# Patient Record
Sex: FEMALE | Race: OTHER | Employment: STUDENT | URBAN - METROPOLITAN AREA
[De-identification: names, ages, dates, MRNs, and addresses within clinical notes are randomized per-mention and may not be internally consistent; named-entity substitution may affect disease eponyms.]

---
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
## 2025-01-06 ENCOUNTER — TELEPHONE (OUTPATIENT)
Age: 20
End: 2025-01-06

## 2025-01-06 NOTE — TELEPHONE ENCOUNTER
Mahendra Lucas,     Here are some therapy and PHP/IOP program resources that may be a good fit. Please verify your insurance coverage with any providers that you may choose to call and schedule with directly. If there is anything else I can assist with, then please give me a call at [phone]. If you need more immediate assistance, or assistance outside of business hours, please contact the [facility] 24/7 helpline at [phone].     Therapy:    Yaneth Sears LCPC  [facility]  [address]  [address]  [place] [address]   Phone: [phone]    Dr. Jiaro Garzon, Psy.D  [facility]  [address]  [address]  [place] [address]   Phone: [phone]    Monie Reyes [facility]  [address]  [address]  [place] [address]   Phone: [phone]    DARREN Price  [address]  [address]  [place] [address]   Phone: [phone]    PHP/IOP Program Resources:    [facility]  [address], [place]   Phone: [phone]    [address]  Phone: [phone]    [facility]  [address], [place]   Phone: [phone]      Barbara Almeida (she/her/hers)  Patient Care Navigator Mental Health   [facility]/Mental Health Division  [phone] or 24/7 help line: [phone]  [email or web address]  Request an assessment or support »

## 2025-03-04 ENCOUNTER — OFFICE VISIT (OUTPATIENT)
Facility: LOCATION | Age: 20
End: 2025-03-04
Payer: COMMERCIAL

## 2025-03-04 ENCOUNTER — LAB ENCOUNTER (OUTPATIENT)
Dept: LAB | Age: 20
End: 2025-03-04
Attending: INTERNAL MEDICINE
Payer: COMMERCIAL

## 2025-03-04 VITALS
HEART RATE: 58 BPM | WEIGHT: 101 LBS | SYSTOLIC BLOOD PRESSURE: 94 MMHG | DIASTOLIC BLOOD PRESSURE: 62 MMHG | BODY MASS INDEX: 19.07 KG/M2 | HEIGHT: 61 IN

## 2025-03-04 DIAGNOSIS — F41.9 ANXIETY: ICD-10-CM

## 2025-03-04 DIAGNOSIS — R53.83 FATIGUE, UNSPECIFIED TYPE: ICD-10-CM

## 2025-03-04 DIAGNOSIS — E05.00 GRAVES DISEASE: ICD-10-CM

## 2025-03-04 DIAGNOSIS — E07.9 THYROID DISEASE: ICD-10-CM

## 2025-03-04 DIAGNOSIS — E05.90 HYPERTHYROIDISM: ICD-10-CM

## 2025-03-04 LAB — TSI SER-ACNC: 3.88 UIU/ML (ref 0.48–4.17)

## 2025-03-04 PROCEDURE — 99214 OFFICE O/P EST MOD 30 MIN: CPT | Performed by: INTERNAL MEDICINE

## 2025-03-04 PROCEDURE — 84443 ASSAY THYROID STIM HORMONE: CPT

## 2025-03-04 PROCEDURE — 36415 COLL VENOUS BLD VENIPUNCTURE: CPT

## 2025-03-04 RX ORDER — METHIMAZOLE 10 MG/1
10 TABLET ORAL 2 TIMES DAILY
Qty: 60 TABLET | Refills: 2 | Status: SHIPPED | OUTPATIENT
Start: 2025-03-04 | End: 2025-03-05

## 2025-03-04 RX ORDER — PROPRANOLOL HYDROCHLORIDE 60 MG/1
1 CAPSULE, EXTENDED RELEASE ORAL DAILY
Qty: 90 CAPSULE | Refills: 1 | Status: SHIPPED | OUTPATIENT
Start: 2025-03-04

## 2025-03-04 NOTE — PROGRESS NOTES
Reason for Visit:   Hyperthyroid  Requesting Physician:  AHMET MONCADA    CHIEF COMPLAINT:    Chief Complaint   Patient presents with    Hyperthyroidism     F/u      HISTORY OF PRESENT ILLNESS:   Shayy Martins is a 19 year old female who presents with  hyperthyroid due to graves   TSI high 6/2024   Was seen with mother   On MMI 10 mg every day but she skips x2 /week   Propranolol  60 mg ER   We referred to a therapist but she did not schedule it   Has a job now     She has anxiety, and too hyper sometimes.   She cannot concentrate and jittery   We discussed side effect from meds.   She reports taking sea vasquez which gave her palpitation - we discuss supplement and hyperthyroid from it     LMP 2/2025    Symptoms started  ~ 2 yrs ago. Passed out  at [employer] and went to the ER 3/2024   5/30/2024 went to the ER d/t abd pain was dx with UTI  CTA in the ER 5/30/2024    Not on birth control. Not sexually active.   Neck radiation: No   Biotin:  no  Turmeric: no       Wt Readings from Last 6 Encounters:   03/04/25 101 lb (45.8 kg) (5%, Z= -1.69)*   12/27/24 101 lb (45.8 kg) (5%, Z= -1.67)*   08/23/24 98 lb 4.8 oz (44.6 kg) (3%, Z= -1.89)*   08/05/24 96 lb (43.5 kg) (2%, Z= -2.11)*   07/09/24 97 lb 6.4 oz (44.2 kg) (2%, Z= -1.96)*   06/06/24 94 lb (42.6 kg) (1%, Z= -2.31)*     * Growth percentiles are based on CDC (Girls, 2-20 Years) data.         ASSESSMENT AND PLAN:  19 year old female who presents with  hyperthyroid due to Graves and anxiety   Clinically and biochemically, euthyroid   She is on BB and MMI  20 mg every day   On exam no GO. Has goiter. Not a smoker.   Not on birth control but pt and mother understand risk of complications from pregnancy in her case ( hyperthyroid, and side effect of Methimazole on pregnancy)   Discussed surgery vs RDZ vs meds again today.   She was skipping doses   Had hyperthyroid sx when took supplement most likely d/t iodine in them.     Plan      Propranolol ER 60 mg once a day    Methimazole 10 mg x2/day     D/w them today Dx graves, treatment options. Methimazole may cause significant bone marrow depression; the most severe manifestation is agranulocytosis. May also cause Hepatotoxicity (including acute liver failure) Symptoms suggestive of hepatic dysfunction (eg, anorexia, pruritus, right upper quadrant pain) should prompt evaluation. If you have nausea, vomiting, abdominal pain, jaundice- yellow skin or eye color-, dark urine, light stool color, fever, sore throat or infection, call us or the PCP.  Remission rate of ~ 40% with use of antithyroid drugs for 1-1.5 years, their side effects, monitoring, and follow-up issues, specifically agranulocytosis, liver toxicity, anaphylaxis, rash, lupus like syndrome, metallic taste in the mouth, and joint aches. We discussed that patient should discontinue methimazole at the earliest sign of a fever, sore throat, or other infection, should call our office and have WBC count with differential done. The drug should be held until the result is available.     If applicable, Hyperthyroid pregnancy counseling. General counseling on contraception (Z30.09).  We discussed in details the adverse effect of uncontrolled hyperthyroidism on pregnancy    Also discussed the risk of Methimazole on the fetus.   Please notify us if you are pregnant or you are planning to get pregnant.   Print out was given to the Pt     PAST MEDICAL HISTORY:   Past Medical History:    Hyperthyroidism     Hyperthyroid   asthma as a child  PAST SURGICAL HISTORY:   History reviewed. No pertinent surgical history.  no  CURRENT MEDICATIONS:     Propranolol HCl ER 60 MG Oral Capsule SR 24 Hr Take 1 capsule (60 mg total) by mouth daily. 90 capsule 1    methIMAzole 10 MG Oral Tab Take 1 tablet (10 mg total) by mouth in the morning and 1 tablet (10 mg total) before bedtime. 60 tablet 2   Inhaler     ALLERGIES:  No Known Allergies  no  SOCIAL HISTORY:    Social History     Socioeconomic  History    Marital status: Single   Tobacco Use    Smoking status: Never    Smokeless tobacco: Never   Substance and Sexual Activity    Alcohol use: Never    Drug use: Never   Finished home school    Smoking no  Marijuana no  Etoh no  Drugs no  Not on birth control , not planning pregnancy, never been pregnant before     FAMILY HISTORY:   History reviewed. No pertinent family history.   GF with DM   MGM breast ca , bipolar   MGF with cancer  Mother w/ Sjogren and migraine     PHYSICAL EXAM:   Height: 5' 1\" (154.9 cm) (03/04 1428)  Weight: 101 lb (45.8 kg) (03/04 1428)  BSA (Calculated - sq m): 1.41 sq meters (03/04 1428)  Pulse: 58 (03/04 1428)  BP: 94/62 (03/04 1428)  Temp: --  Do Not Use - Resp Rate: --  SpO2: --    Body mass index is 19.08 kg/m².   No tremors    No GO      DATA:     Pertinent data reviewed      Latest Reference Range & Units 08/05/24 19:35   D-Dimer <0.50 ug/mL FEU 1.32 (H)   T4,Free (Direct) 0.9 - 1.6 ng/dL 2.4 (H)   TSH 0.480 - 4.170 mIU/mL <0.008 (L)   (H): Data is abnormally high  (L): Data is abnormally low   08/05/24 22:06   MRI BRAIN WO ACUTE (3) SEQUENCE (CPT=70551) Rpt   Rpt: View report in Results Review for more information   08/05/24 23:47   CT CHEST PE AORTA (IV ONLY) (CPT=71260) Rpt   Rpt: View report in Results Review for more information   Latest Reference Range & Units 06/06/24 17:26   Thy Stim Immuno 0.00 - 0.55 IU/L 11.70 (H)        7/8/24  4:59 PM    Free T4  0.75 - 2.00 ng/dL 3.14 High      7/8/24  4:59 PM    Auto WBC  4.2 - 9.7 10*3 /uL 5.4        Latest Reference Range & Units 08/05/24 19:30   POCT urine pregnancy Negative  Negative            No results for input(s): \"TSH\", \"T4F\", \"T3F\", \"THYP\" in the last 72 hours.      No results found.        Orders Placed This Encounter   Procedures    TSH W Reflex To Free T4    TSH W Reflex To Free T4     Orders Placed This Encounter    TSH W Reflex To Free T4     Standing Status:   Future     Standing Expiration Date:   3/4/2026      Order Specific Question:   Release to patient     Answer:   Immediate    TSH W Reflex To Free T4     Standing Status:   Future     Standing Expiration Date:   3/4/2026     Order Specific Question:   Release to patient     Answer:   Immediate    Propranolol HCl ER 60 MG Oral Capsule SR 24 Hr     Sig: Take 1 capsule (60 mg total) by mouth daily.     Dispense:  90 capsule     Refill:  1     **Patient requests 90 days supply**    methIMAzole 10 MG Oral Tab     Sig: Take 1 tablet (10 mg total) by mouth in the morning and 1 tablet (10 mg total) before bedtime.     Dispense:  60 tablet     Refill:  2          This is a specialized patient consultation in endocrinology and required comprehensive review of prior records, as well as current evaluation, with time required for consideration of complex endocrine issues and consultation. For this visit, I personally interviewed the patient, and family member if accompanied, performed the pertinent parts of the history and physical examination. ROS included screening for appropriate endocrine conditions.   Today's diagnosis and plan were reviewed in detail with the patient who states understanding and agrees with plan. I discussed with the patient possible diagnosis, differential diagnosis, need for work up, treatment options, alternatives and side effects.     Please see note for details about time spent which includes:   · pre-visit preparation  · reviewing records  · face to face time with the patient   · timely documentation of the encounter  · ordering medications/tests  · communication with care team  · care coordination    I appreciate the opportunity to be part of your patient's medical care and will keep you, as the referring and primary physicians, informed about the care of your patient. Please feel free to contact me should you have any questions.    The 21st Century Cures Act makes medical notes like these available to patients in the interest of transparency. Please  be advised this is a medical document. Medical documents are intended to carry relevant information, facts as evident, and the clinical opinion of the practitioner. The medical note is intended as peer to peer communication and may appear blunt or direct. It is written in medical language and may contain abbreviations or verbiage that are unfamiliar.   Nicholas Cain MD

## 2025-03-04 NOTE — PATIENT INSTRUCTIONS
Labs today   Labs and follow up in 2 mo     Propranolol ER 60 mg once a day   Methimazole 10 mg x2/day     D/w them today Dx graves, treatment options. Methimazole may cause significant bone marrow depression; the most severe manifestation is agranulocytosis. May also cause Hepatotoxicity (including acute liver failure) Symptoms suggestive of hepatic dysfunction (eg, anorexia, pruritus, right upper quadrant pain) should prompt evaluation. If you have nausea, vomiting, abdominal pain, jaundice- yellow skin or eye color-, dark urine, light stool color, fever, sore throat or infection, call us or the PCP.  Remission rate of ~ 40% with use of antithyroid drugs for 1-1.5 years, their side effects, monitoring, and follow-up issues, specifically agranulocytosis, liver toxicity, anaphylaxis, rash, lupus like syndrome, metallic taste in the mouth, and joint aches. We discussed that patient should discontinue methimazole at the earliest sign of a fever, sore throat, or other infection, should call our office and have WBC count with differential done. The drug should be held until the result is available.     If applicable, Hyperthyroid pregnancy counseling. General counseling on contraception (Z30.09).  We discussed in details the adverse effect of uncontrolled hyperthyroidism on pregnancy    Also discussed the risk of Methimazole on the fetus.   Please notify us if you are pregnant or you are planning to get pregnant.   Print out was given to the Pt

## 2025-03-05 DIAGNOSIS — E07.9 THYROID DISEASE: ICD-10-CM

## 2025-03-05 DIAGNOSIS — F41.9 ANXIETY: ICD-10-CM

## 2025-03-05 DIAGNOSIS — R53.83 FATIGUE, UNSPECIFIED TYPE: ICD-10-CM

## 2025-03-05 DIAGNOSIS — E05.00 GRAVES DISEASE: ICD-10-CM

## 2025-03-05 DIAGNOSIS — E05.90 HYPERTHYROIDISM: ICD-10-CM

## 2025-03-05 RX ORDER — METHIMAZOLE 5 MG/1
15 TABLET ORAL DAILY
Qty: 270 TABLET | Refills: 0 | Status: SHIPPED | OUTPATIENT
Start: 2025-03-05

## 2025-05-16 DIAGNOSIS — R53.83 FATIGUE, UNSPECIFIED TYPE: ICD-10-CM

## 2025-05-16 DIAGNOSIS — E05.00 GRAVES DISEASE: ICD-10-CM

## 2025-05-16 DIAGNOSIS — E05.90 HYPERTHYROIDISM: ICD-10-CM

## 2025-05-16 DIAGNOSIS — F41.9 ANXIETY: ICD-10-CM

## 2025-05-16 DIAGNOSIS — E07.9 THYROID DISEASE: ICD-10-CM

## 2025-05-17 RX ORDER — PROPRANOLOL HYDROCHLORIDE 60 MG/1
1 CAPSULE, EXTENDED RELEASE ORAL DAILY
Qty: 30 CAPSULE | Refills: 0 | OUTPATIENT
Start: 2025-05-17

## 2025-05-17 NOTE — TELEPHONE ENCOUNTER
LOV:3/04/2025  RTC: 2M, no upcoming/MyChart sent  Last refill: 03/04/2025    Dr. Cain-please review and sign pended prescription if agreeable.

## 2025-05-18 RX ORDER — PROPRANOLOL HYDROCHLORIDE 60 MG/1
1 CAPSULE, EXTENDED RELEASE ORAL DAILY
Qty: 90 CAPSULE | Refills: 0 | Status: SHIPPED | OUTPATIENT
Start: 2025-05-18

## 2025-07-01 ENCOUNTER — TELEPHONE (OUTPATIENT)
Facility: LOCATION | Age: 20
End: 2025-07-01

## 2025-07-01 ENCOUNTER — TELEMEDICINE (OUTPATIENT)
Facility: LOCATION | Age: 20
End: 2025-07-01
Payer: COMMERCIAL

## 2025-07-01 DIAGNOSIS — E05.90 HYPERTHYROIDISM: Primary | ICD-10-CM

## 2025-07-01 DIAGNOSIS — R53.83 FATIGUE, UNSPECIFIED TYPE: ICD-10-CM

## 2025-07-01 DIAGNOSIS — E07.9 THYROID DISEASE: ICD-10-CM

## 2025-07-01 DIAGNOSIS — F41.9 ANXIETY: ICD-10-CM

## 2025-07-01 DIAGNOSIS — E05.00 GRAVES DISEASE: ICD-10-CM

## 2025-07-01 PROCEDURE — 99214 OFFICE O/P EST MOD 30 MIN: CPT | Performed by: INTERNAL MEDICINE

## 2025-07-01 RX ORDER — PROPRANOLOL HYDROCHLORIDE 60 MG/1
1 CAPSULE, EXTENDED RELEASE ORAL DAILY
Qty: 90 CAPSULE | Refills: 0 | Status: SHIPPED | OUTPATIENT
Start: 2025-07-01

## 2025-07-01 RX ORDER — METHIMAZOLE 5 MG/1
15 TABLET ORAL DAILY
Qty: 270 TABLET | Refills: 0 | Status: SHIPPED | OUTPATIENT
Start: 2025-07-01

## 2025-07-01 NOTE — PATIENT INSTRUCTIONS
Labs today and will reassess if can decrease MMI  doses   Labs and follow up in 2 months   Propranolol ER 60 mg once a day   Methimazole 15 mg /day     Will refer to psych     D/w them today Dx graves, treatment options. Methimazole may cause significant bone marrow depression; the most severe manifestation is agranulocytosis. May also cause Hepatotoxicity (including acute liver failure) Symptoms suggestive of hepatic dysfunction (eg, anorexia, pruritus, right upper quadrant pain) should prompt evaluation. If you have nausea, vomiting, abdominal pain, jaundice- yellow skin or eye color-, dark urine, light stool color, fever, sore throat or infection, call us or the PCP.  Remission rate of ~ 40% with use of antithyroid drugs for 1-1.5 years, their side effects, monitoring, and follow-up issues, specifically agranulocytosis, liver toxicity, anaphylaxis, rash, lupus like syndrome, metallic taste in the mouth, and joint aches. We discussed that patient should discontinue methimazole at the earliest sign of a fever, sore throat, or other infection, should call our office and have WBC count with differential done. The drug should be held until the result is available.   If applicable, Hyperthyroid pregnancy counseling. General counseling on contraception (Z30.09).  We discussed in details the adverse effect of uncontrolled hyperthyroidism on pregnancy    Also discussed the risk of Methimazole on the fetus.   Please notify us if you are pregnant or you are planning to get pregnant.

## 2025-07-01 NOTE — PROGRESS NOTES
Reason for Visit:   Hyperthyroid  Requesting Physician:  AHMET MONCADA    CHIEF COMPLAINT:    No chief complaint on file.     HISTORY OF PRESENT ILLNESS:   Shayy Martins is a 19 year old female who presents with  hyperthyroid due to graves   TSI high 6/2024   She has anxiety , SOB and fatigue   On MMI 15 mg every day  and Propranolol  60 mg ER.   She is not skipping doses   We referred to a therapist but she did not schedule it   Has a job now.  LMP 6/2025    Symptoms started  ~ 2 yrs ago. Passed out and went to the ER 3/2024   5/30/2024 went to the ER d/t abd pain was dx with UTI  CTA in the ER 5/30/2024  Not on birth control. Not sexually active.   Neck radiation: No   Biotin:  no  Turmeric: no     Wt Readings from Last 6 Encounters:   03/04/25 101 lb (45.8 kg) (5%, Z= -1.69)*   12/27/24 101 lb (45.8 kg) (5%, Z= -1.67)*   08/23/24 98 lb 4.8 oz (44.6 kg) (3%, Z= -1.89)*   08/05/24 96 lb (43.5 kg) (2%, Z= -2.11)*   07/09/24 97 lb 6.4 oz (44.2 kg) (2%, Z= -1.96)*   06/06/24 94 lb (42.6 kg) (1%, Z= -2.31)*     * Growth percentiles are based on CDC (Girls, 2-20 Years) data.       ASSESSMENT AND PLAN:  19 year old female who presents with  hyperthyroid due to Graves and anxiety   Clinically, nonspecific symptoms   Will do labs soon   She is on propranolol 60 MG every day  and MMI 15 mg every day   Not a smoker.   Not on birth control. Pt  understands risk of complications from pregnancy in her case ( hyperthyroid, and side effect of Methimazole on pregnancy)   Discussed surgery vs RDZ vs meds again today.       Plan  Labs today and will reassess if can decrease MMI  doses   Labs and follow up in 2 months   Propranolol ER 60 mg once a day   Methimazole 15 mg /day     Will refer to psych     D/w them today Dx graves, treatment options. Methimazole may cause significant bone marrow depression; the most severe manifestation is agranulocytosis. May also cause Hepatotoxicity (including acute liver failure) Symptoms  suggestive of hepatic dysfunction (eg, anorexia, pruritus, right upper quadrant pain) should prompt evaluation. If you have nausea, vomiting, abdominal pain, jaundice- yellow skin or eye color-, dark urine, light stool color, fever, sore throat or infection, call us or the PCP.  Remission rate of ~ 40% with use of antithyroid drugs for 1-1.5 years, their side effects, monitoring, and follow-up issues, specifically agranulocytosis, liver toxicity, anaphylaxis, rash, lupus like syndrome, metallic taste in the mouth, and joint aches. We discussed that patient should discontinue methimazole at the earliest sign of a fever, sore throat, or other infection, should call our office and have WBC count with differential done. The drug should be held until the result is available.   If applicable, Hyperthyroid pregnancy counseling. General counseling on contraception (Z30.09).  We discussed in details the adverse effect of uncontrolled hyperthyroidism on pregnancy    Also discussed the risk of Methimazole on the fetus.   Please notify us if you are pregnant or you are planning to get pregnant.       PAST MEDICAL HISTORY:   Past Medical History:    Hyperthyroidism     Hyperthyroid   asthma as a child  PAST SURGICAL HISTORY:   History reviewed. No pertinent surgical history.  no  CURRENT MEDICATIONS:     Propranolol HCl ER 60 MG Oral Capsule SR 24 Hr Take 1 capsule (60 mg total) by mouth daily. 90 capsule 0    methIMAzole 5 MG Oral Tab Take 3 tablets (15 mg total) by mouth daily. 270 tablet 0   Inhaler     ALLERGIES:  No Known Allergies  no  SOCIAL HISTORY:    Social History     Socioeconomic History    Marital status: Single   Tobacco Use    Smoking status: Never    Smokeless tobacco: Never   Substance and Sexual Activity    Alcohol use: Never    Drug use: Never   Finished home school    Smoking no  Marijuana no  Etoh no  Drugs no  Not on birth control , not planning pregnancy, never been pregnant before     FAMILY HISTORY:    History reviewed. No pertinent family history.   GF with DM   MGM breast ca , bipolar   MGF with cancer  Mother w/ Sjogren and migraine     PHYSICAL EXAM:   Height: --  Weight: --  BSA (Calculated - sq m): --  Pulse: --  BP: --  Temp: --  Do Not Use - Resp Rate: --  SpO2: --    There is no height or weight on file to calculate BMI.         DATA:     Pertinent data reviewed        03/04/25 15:18   TSH 0.480 - 4.170 uIU/mL 3.884      Latest Reference Range & Units 08/05/24 19:35   D-Dimer <0.50 ug/mL FEU 1.32 (H)   T4,Free (Direct) 0.9 - 1.6 ng/dL 2.4 (H)   TSH 0.480 - 4.170 mIU/mL <0.008 (L)   (H): Data is abnormally high  (L): Data is abnormally low   08/05/24 22:06   MRI BRAIN WO ACUTE (3) SEQUENCE (CPT=70551) Rpt   Rpt: View report in Results Review for more information   08/05/24 23:47   CT CHEST PE AORTA (IV ONLY) (CPT=71260) Rpt   Rpt: View report in Results Review for more information   Latest Reference Range & Units 06/06/24 17:26   Thy Stim Immuno 0.00 - 0.55 IU/L 11.70 (H)        7/8/24  4:59 PM    Free T4  0.75 - 2.00 ng/dL 3.14 High      7/8/24  4:59 PM    Auto WBC  4.2 - 9.7 10*3 /uL 5.4        Latest Reference Range & Units 08/05/24 19:30   POCT urine pregnancy Negative  Negative            No results for input(s): \"TSH\", \"T4F\", \"T3F\", \"THYP\" in the last 72 hours.      No results found.        Orders Placed This Encounter   Procedures    TSH RECEPTOR ANTIBODY (TBII)    Thyroid Stimulating Immunoglobulin    TSH and Free T4    Free T3 (Triiodothryronine)    TSH W Reflex To Free T4     Orders Placed This Encounter    TSH RECEPTOR ANTIBODY (TBII)     Release to patient:   Immediate    Thyroid Stimulating Immunoglobulin     Release to patient:   Immediate    TSH and Free T4     Release to patient:   Immediate    Free T3 (Triiodothryronine)     Release to patient:   Immediate    TSH W Reflex To Free T4     Standing Status:   Future     Expected Date:   8/1/2025     Expiration Date:   7/1/2026     Release to  patient:   Immediate    Propranolol HCl ER 60 MG Oral Capsule SR 24 Hr     Sig: Take 1 capsule (60 mg total) by mouth daily.     Dispense:  90 capsule     Refill:  0     **Patient requests 90 days supply**    methIMAzole 5 MG Oral Tab     Sig: Take 3 tablets (15 mg total) by mouth daily.     Dispense:  270 tablet     Refill:  0          This is a specialized patient consultation in endocrinology and required comprehensive review of prior records, as well as current evaluation, with time required for consideration of complex endocrine issues and consultation. For this visit, I personally interviewed the patient, and family member if accompanied, performed the pertinent parts of the history and physical examination. ROS included screening for appropriate endocrine conditions.   Today's diagnosis and plan were reviewed in detail with the patient who states understanding and agrees with plan. I discussed with the patient possible diagnosis, differential diagnosis, need for work up, treatment options, alternatives and side effects.     Please see note for details about time spent which includes:   · pre-visit preparation  · reviewing records  · face to face time with the patient   · timely documentation of the encounter  · ordering medications/tests  · communication with care team  · care coordination    I appreciate the opportunity to be part of your patient's medical care and will keep you, as the referring and primary physicians, informed about the care of your patient. Please feel free to contact me should you have any questions.    The 21st Century Cures Act makes medical notes like these available to patients in the interest of transparency. Please be advised this is a medical document. Medical documents are intended to carry relevant information, facts as evident, and the clinical opinion of the practitioner. The medical note is intended as peer to peer communication and may appear blunt or direct. It is written  in medical language and may contain abbreviations or verbiage that are unfamiliar.   Nicholas Cain MD

## 2025-07-02 ENCOUNTER — LAB ENCOUNTER (OUTPATIENT)
Dept: LAB | Age: 20
End: 2025-07-02
Attending: INTERNAL MEDICINE
Payer: COMMERCIAL

## 2025-07-02 DIAGNOSIS — E05.00 GRAVES DISEASE: ICD-10-CM

## 2025-07-02 DIAGNOSIS — E05.90 HYPERTHYROIDISM: Primary | ICD-10-CM

## 2025-07-02 DIAGNOSIS — F41.9 ANXIETY: ICD-10-CM

## 2025-07-02 DIAGNOSIS — E07.9 THYROID DISEASE: ICD-10-CM

## 2025-07-02 DIAGNOSIS — R53.83 FATIGUE: ICD-10-CM

## 2025-07-02 LAB
T3FREE SERPL-MCNC: 2.73 PG/ML (ref 2.4–4.2)
T4 FREE SERPL-MCNC: 1.3 NG/DL (ref 0.9–1.6)
TSI SER-ACNC: 2.02 UIU/ML (ref 0.48–4.17)

## 2025-07-02 PROCEDURE — 84445 ASSAY OF TSI GLOBULIN: CPT | Performed by: INTERNAL MEDICINE

## 2025-07-02 PROCEDURE — 83520 IMMUNOASSAY QUANT NOS NONAB: CPT

## 2025-07-02 PROCEDURE — 36415 COLL VENOUS BLD VENIPUNCTURE: CPT

## 2025-07-02 PROCEDURE — 84443 ASSAY THYROID STIM HORMONE: CPT | Performed by: INTERNAL MEDICINE

## 2025-07-02 PROCEDURE — 84439 ASSAY OF FREE THYROXINE: CPT | Performed by: INTERNAL MEDICINE

## 2025-07-02 PROCEDURE — 84481 FREE ASSAY (FT-3): CPT | Performed by: INTERNAL MEDICINE

## 2025-07-06 ENCOUNTER — PATIENT MESSAGE (OUTPATIENT)
Facility: LOCATION | Age: 20
End: 2025-07-06

## 2025-07-06 LAB
THY STIM IMMUNO: 0.47 IU/L
THYROTROPIN REC AB: <1.1 IU/L

## 2025-07-10 ENCOUNTER — TELEPHONE (OUTPATIENT)
Age: 20
End: 2025-07-10

## 2025-07-10 NOTE — TELEPHONE ENCOUNTER
Hello,    Sorry I missed you - I am reaching out from the [facility] Navigation department, following up on an order from your provider's office to assist in connecting you with resources for care. If you would like to discuss this further, please give us a call back at [phone], or for more immediate assistance you can contact our 24-hour help line at [phone]. We look forward to hearing from you soon.

## 2025-08-11 ENCOUNTER — TELEPHONE (OUTPATIENT)
Age: 20
End: 2025-08-11